# Patient Record
Sex: FEMALE | URBAN - METROPOLITAN AREA
[De-identification: names, ages, dates, MRNs, and addresses within clinical notes are randomized per-mention and may not be internally consistent; named-entity substitution may affect disease eponyms.]

---

## 2020-09-10 ENCOUNTER — APPOINTMENT (RX ONLY)
Dept: URBAN - METROPOLITAN AREA CLINIC 166 | Facility: CLINIC | Age: 45
Setting detail: DERMATOLOGY
End: 2020-09-10

## 2020-09-10 DIAGNOSIS — Z41.9 ENCOUNTER FOR PROCEDURE FOR PURPOSES OTHER THAN REMEDYING HEALTH STATE, UNSPECIFIED: ICD-10-CM

## 2020-09-10 PROBLEM — D23.72 OTHER BENIGN NEOPLASM OF SKIN OF LEFT LOWER LIMB, INCLUDING HIP: Status: ACTIVE | Noted: 2020-09-10

## 2020-09-10 PROCEDURE — ? COSMETIC CONSULTATION: BOTULINUM TOXIN

## 2020-09-10 PROCEDURE — ? BIOPSY BY PUNCH METHOD

## 2020-09-10 PROCEDURE — ? COUNSELING

## 2020-09-10 PROCEDURE — 11104 PUNCH BX SKIN SINGLE LESION: CPT

## 2020-09-10 PROCEDURE — ? BOTOX (U OR CC)

## 2020-09-10 ASSESSMENT — LOCATION DETAILED DESCRIPTION DERM
LOCATION DETAILED: LEFT MEDIAL FOREHEAD
LOCATION DETAILED: RIGHT INFERIOR TEMPLE
LOCATION DETAILED: LEFT SUPERIOR MEDIAL FOREHEAD
LOCATION DETAILED: LEFT FOREHEAD
LOCATION DETAILED: INFERIOR MID FOREHEAD
LOCATION DETAILED: RIGHT FOREHEAD
LOCATION DETAILED: RIGHT SUPERIOR MEDIAL FOREHEAD
LOCATION DETAILED: RIGHT MID TEMPLE
LOCATION DETAILED: LEFT INFERIOR TEMPLE
LOCATION DETAILED: LEFT MID TEMPLE
LOCATION DETAILED: GLABELLA

## 2020-09-10 ASSESSMENT — LOCATION SIMPLE DESCRIPTION DERM
LOCATION SIMPLE: LEFT TEMPLE
LOCATION SIMPLE: INFERIOR FOREHEAD
LOCATION SIMPLE: RIGHT TEMPLE
LOCATION SIMPLE: LEFT FOREHEAD
LOCATION SIMPLE: RIGHT FOREHEAD
LOCATION SIMPLE: GLABELLA

## 2020-09-10 ASSESSMENT — LOCATION ZONE DERM: LOCATION ZONE: FACE

## 2020-09-10 NOTE — PROCEDURE: BOTOX (U OR CC)
Additional Area 6 Units: 0
Detail Level: Detailed
Post-Care Instructions: Patient instructed to not lie down for 4 hours and limit physical activity for 24 hours. Patient instructed not to travel by airplane for 48 hours.
Periorbital Skin Units/Cc: 15
Including Pricing Information In The Note: No
Consent: Written consent obtained. Risks include but not limited to lid/brow ptosis, bruising, swelling, diplopia, temporary effect, incomplete chemical denervation.
Document As Units Or Cc?: units
Forehead Units/Cc: 24
Dilution (U/0.1 Cc): 4
Quantity Per Injection Site (Units Or Cc): 4 U
Quantity Per Injection Site (Units Or Cc): 3 U

## 2020-09-10 NOTE — PROCEDURE: BIOPSY BY PUNCH METHOD
Detail Level: Detailed
Was A Bandage Applied: Yes
Punch Size In Mm: 5
Biopsy Type: H and E
Anesthesia Type: 1% lidocaine with epinephrine
Anesthesia Volume In Cc (Will Not Render If 0): 0.5
Additional Anesthesia Volume In Cc (Will Not Render If 0): 0
Hemostasis: Aluminum Chloride
Epidermal Sutures: 4-0 Ethilon
Wound Care: Petrolatum
Dressing: bandage
Suture Removal: 14 days
Patient Will Remove Sutures At Home?: No
Lab: 6
Lab Facility: 3
Consent: Written consent was obtained and risks were reviewed including but not limited to scarring, infection, bleeding, scabbing, incomplete removal, nerve damage and allergy to anesthesia.
Post-Care Instructions: I reviewed with the patient in detail post-care instructions. Patient is to keep the biopsy site dry overnight, and then apply bacitracin twice daily until healed. Patient may apply hydrogen peroxide soaks to remove any crusting.
Home Suture Removal Text: Patient was provided a home suture removal kit and will remove their sutures at home.  If they have any questions or difficulties they will call the office.
Notification Instructions: Patient will be notified of biopsy results. However, patient instructed to call the office if not contacted within 2 weeks.
Billing Type: Third-Party Bill
Information: Selecting Yes will display possible errors in your note based on the variables you have selected. This validation is only offered as a suggestion for you. PLEASE NOTE THAT THE VALIDATION TEXT WILL BE REMOVED WHEN YOU FINALIZE YOUR NOTE. IF YOU WANT TO FAX A PRELIMINARY NOTE YOU WILL NEED TO TOGGLE THIS TO 'NO' IF YOU DO NOT WANT IT IN YOUR FAXED NOTE.

## 2020-09-10 NOTE — PROCEDURE: COSMETIC CONSULTATION: BOTULINUM TOXIN
Price (Use Numbers Only, No Special Characters Or $): 180 Price (Use Numbers Only, No Special Characters Or $): 702

## 2020-09-24 ENCOUNTER — APPOINTMENT (RX ONLY)
Dept: URBAN - METROPOLITAN AREA CLINIC 166 | Facility: CLINIC | Age: 45
Setting detail: DERMATOLOGY
End: 2020-09-24

## 2020-09-24 DIAGNOSIS — Z48.02 ENCOUNTER FOR REMOVAL OF SUTURES: ICD-10-CM

## 2020-09-24 DIAGNOSIS — Z41.9 ENCOUNTER FOR PROCEDURE FOR PURPOSES OTHER THAN REMEDYING HEALTH STATE, UNSPECIFIED: ICD-10-CM | Status: WELL CONTROLLED

## 2020-09-24 PROCEDURE — ? OTHER

## 2020-09-24 PROCEDURE — 99024 POSTOP FOLLOW-UP VISIT: CPT

## 2020-09-24 PROCEDURE — ? SUTURE REMOVAL (GLOBAL PERIOD)

## 2020-09-24 ASSESSMENT — LOCATION DETAILED DESCRIPTION DERM: LOCATION DETAILED: LEFT DISTAL PRETIBIAL REGION

## 2020-09-24 ASSESSMENT — LOCATION SIMPLE DESCRIPTION DERM: LOCATION SIMPLE: LEFT PRETIBIAL REGION

## 2020-09-24 ASSESSMENT — PAIN INTENSITY VAS: HOW INTENSE IS YOUR PAIN 0 BEING NO PAIN, 10 BEING THE MOST SEVERE PAIN POSSIBLE?: NO PAIN

## 2020-09-24 ASSESSMENT — LOCATION ZONE DERM: LOCATION ZONE: LEG

## 2020-09-24 NOTE — PROCEDURE: OTHER
Detail Level: Zone
Note Text (......Xxx Chief Complaint.): This diagnosis correlates with the
Other (Free Text): Patient was given .4 more units today.\\nPatient is very happy with the treatment.

## 2020-09-24 NOTE — PROCEDURE: SUTURE REMOVAL (GLOBAL PERIOD)
Detail Level: Detailed
Add 55120 Cpt? (Important Note: In 2017 The Use Of 09255 Is Being Tracked By Cms To Determine Future Global Period Reimbursement For Global Periods): yes